# Patient Record
Sex: FEMALE
[De-identification: names, ages, dates, MRNs, and addresses within clinical notes are randomized per-mention and may not be internally consistent; named-entity substitution may affect disease eponyms.]

---

## 2024-09-24 PROBLEM — Z00.00 ENCOUNTER FOR PREVENTIVE HEALTH EXAMINATION: Status: ACTIVE | Noted: 2024-09-24

## 2024-09-25 ENCOUNTER — APPOINTMENT (OUTPATIENT)
Dept: UROLOGY | Facility: CLINIC | Age: 29
End: 2024-09-25
Payer: COMMERCIAL

## 2024-09-25 VITALS
TEMPERATURE: 98.4 F | HEIGHT: 67 IN | HEART RATE: 80 BPM | SYSTOLIC BLOOD PRESSURE: 128 MMHG | DIASTOLIC BLOOD PRESSURE: 88 MMHG | WEIGHT: 156 LBS | BODY MASS INDEX: 24.48 KG/M2

## 2024-09-25 DIAGNOSIS — Z82.49 FAMILY HISTORY OF ISCHEMIC HEART DISEASE AND OTHER DISEASES OF THE CIRCULATORY SYSTEM: ICD-10-CM

## 2024-09-25 DIAGNOSIS — R35.0 FREQUENCY OF MICTURITION: ICD-10-CM

## 2024-09-25 DIAGNOSIS — M62.89 OTHER SPECIFIED DISORDERS OF MUSCLE: ICD-10-CM

## 2024-09-25 DIAGNOSIS — Z78.9 OTHER SPECIFIED HEALTH STATUS: ICD-10-CM

## 2024-09-25 PROCEDURE — 99204 OFFICE O/P NEW MOD 45 MIN: CPT

## 2024-09-25 RX ORDER — TRETINOIN 0.5 MG/G
CREAM TOPICAL
Refills: 0 | Status: ACTIVE | COMMUNITY

## 2024-09-25 NOTE — ASSESSMENT
[FreeTextEntry1] : 29 year old with one month urinary frequency with negative cultures.  Suspect pelvic floor dysfunction/dysfunctional voiding. With regards to her frequency and PFD, we discussed behavioral modification strategies including fluid restriction or additional hydration; avoidance of certain foods known to be common bladder irritants such as coffee or citrus products; use of an elimination diet to determine which foods or fluids may contribute to symptoms; pelvic floor muscle relaxation and bladder training with urge suppression with pelvic floor PT.  Plan: - UA, culture - avoidance bladder triggers - pelvic floor PT - fu 3 months

## 2024-09-25 NOTE — HISTORY OF PRESENT ILLNESS
[FreeTextEntry1] : Name GIO NEWBERRY  MRN [id number]   1995  Contact Number: [phone] ----------------------------------------------------------------------------------------------------------------------------------------- Date of First Visit: 24 Referring Provider/PCP: between PCP -----------------------------------------------------------------------------------------------------------------------------------------  CC: pelvic discomfort  History of Present Illness: GIO NEWBERRY is a 29 year old female who reports over a month ago developed mild UTI symptoms - mild frequency usually just before bed and after biking. Went to gyn and was told dip was positive and gave patient macrobid, but culture was ultimately negative. Symptoms did subside for a few day and then came back a few days after finished abx. Did another culture and gave another round of abx - Bactrim, but culture negative again. Then felt had pelvic floor pain. Last dose was this past Saturday. Current symptoms: mild pelvic floor pain, mild frequency. Feels like emptying bladder fully. Occasional nocturia.  Patient reports had 1 UTI in the past and had similar symptoms about 3 years ago. No history of hematuria   Bladder triggers: caffeine 1 cup coffee/daily and 1 cup caffeinated soda daily, + carbonation daily, + citrus, + spicy foods, + tomatoes, occasional pineapple, social alcohol, no smoking   Comorbid conditions: no neurologic diseases, no mobility deficits, no pain with intercourse, + jaw clenching  PVR (to ensure adequate emptying): 0  PMH: none PSH: none Family History: HTN, HLD Social: single, no children, never smoker, social alcohol, + marijuana edibles Meds: tretinoin topical Allergies: NKDA ROS: no fevers, chils, flank pain ----------------------------------------------------------------------------------------------------------------------------------------- Labs: 24: UCx: <10K CFU ( 5 days after abx)

## 2024-09-25 NOTE — PHYSICAL EXAM
[General Appearance - Well Developed] : well developed [General Appearance - Well Nourished] : well nourished [Abdomen Soft] : soft [Chaperone Declined] : Patient declined chaperone [de-identified] : + point tenderness R anterior pelvic floor

## 2024-12-10 ENCOUNTER — NON-APPOINTMENT (OUTPATIENT)
Age: 29
End: 2024-12-10

## 2024-12-17 ENCOUNTER — APPOINTMENT (OUTPATIENT)
Dept: UROLOGY | Facility: CLINIC | Age: 29
End: 2024-12-17